# Patient Record
Sex: MALE | Race: WHITE | Employment: FULL TIME
[De-identification: names, ages, dates, MRNs, and addresses within clinical notes are randomized per-mention and may not be internally consistent; named-entity substitution may affect disease eponyms.]

---

## 2021-11-18 ENCOUNTER — TRANSCRIBE ORDERS (OUTPATIENT)
Dept: OTHER | Age: 41
End: 2021-11-18

## 2021-11-18 DIAGNOSIS — M79.7 FIBROMYALGIA: Primary | ICD-10-CM

## 2022-01-19 ENCOUNTER — OFFICE VISIT (OUTPATIENT)
Dept: ANESTHESIOLOGY | Facility: CLINIC | Age: 42
End: 2022-01-19
Payer: COMMERCIAL

## 2022-01-19 VITALS — OXYGEN SATURATION: 100 % | HEART RATE: 98 BPM | SYSTOLIC BLOOD PRESSURE: 136 MMHG | DIASTOLIC BLOOD PRESSURE: 83 MMHG

## 2022-01-19 DIAGNOSIS — M79.7 FIBROMYALGIA: Primary | ICD-10-CM

## 2022-01-19 PROCEDURE — 99204 OFFICE O/P NEW MOD 45 MIN: CPT | Performed by: ANESTHESIOLOGY

## 2022-01-19 ASSESSMENT — PAIN SCALES - GENERAL: PAINLEVEL: SEVERE PAIN (6)

## 2022-01-19 NOTE — NURSING NOTE
Patient presents with:  Consult: UMP NEW,RM 14, patient reports 6/10 pain in their knees and feet      Severe Pain (6)         What medications are you using for pain? N/A    (New patients only) Have you been seen by another pain clinic/ provider? N/A      Yonny Maza, EMT

## 2022-01-19 NOTE — PROGRESS NOTES
"                      [facility] Consultation    Date of visit: 1/19/2022    Reason for consultation:    Rosa Mcknight is a 41 year old male who is seen in consultation today [facility].    Primary Care Provider is Clinic, [facility].  Pain medications are being prescribed by PCP.    Please see the [facility] health questionnaire which the patient completed and reviewed with me in detail.    Chief Complaint:    Chief Complaint   Patient presents with     Consult     UMP NEW,RM 14, patient reports 6/10 pain in their knees and feet       Pain history:  Rosa Mcknight is a 41 year old male who first started having problems with pain throughout her body for several years. She fell while in the  and attributes this to the beginning of her pain. 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 [facility] but is [facility] for another opinion.     She notes chronic fatigue, morning \"sick to the stomach\" and morning daily migraines. [facility] and she wished for \"something more\" for possible other therapies such as massage therapy. Dr Machado prescribed magnesium and B2 vitamin which helped for the day but the same symptoms occur in the morning. She has been diagnosed with GERD but has not had an EGD. Her son does have a history of ulcerative colitis and IBS is something in the family.     Dr Machado did diagnose her with fibromyalgia as this was not likely rheumatoid arthritis.   The pain is always worse in the morning and improves throughout the day  The worst of the pain is in her head and in the neck. However, her symptoms are migratory in nature and she identifies stress as a significant trigger for worsening of her symptoms.       Pain rating: " intensity ranges from Averages 8/10 on a 0-10 scale.  Aggravating factors include: stress, gluten  Relieving factors include: heat packs, helps at bedtime  Any bowel or bladder incontinence: no    Current treatments include:  Magnesium   B2  Midol    Other treatments have included:  Rosa Mcknight has been seen at a pain clinic in the past.  [facility]  PT: yes  Acupuncture: yes  Chiropractic: yes  TENs Unit: no  Injections: no    Past Medical History:  Past Medical History:   Diagnosis Date     Anxiety      Depressive disorder      There are no problems to display for this patient.      Past Surgical History:  No past surgical history on file.  Medications:  Current Outpatient Medications   Medication Sig Dispense Refill     BuPROPion HCl (WELLBUTRIN PO)  (Patient not taking: Reported on 1/19/2022)       HYDROXYZINE HCL PO  (Patient not taking: Reported on 1/19/2022)       LORazepam (ATIVAN) 1 MG tablet Take 1 tablet (1 mg) by mouth once as needed for anxiety (Patient not taking: Reported on 1/19/2022) 1 tablet 0     Allergies:     Allergies   Allergen Reactions     Kiwi Anaphylaxis     Banana      Other reaction(s): GI Upset     Coconut Fatty Acids Itching     Latex Hives     Mangifera Indica Itching     Percocet [Oxycodone-Acetaminophen] Anxiety and Itching     Pineapple Hives     Other reaction(s): Numbness     Social History:  Home situation: lives with 3 teenage children 19,18, 14 (all kids have special needs)  Occupation/Schooling: 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  Family history of headaches: sister has history of migraines    Review of Systems:    POSTIVE IN BOLD  GENERAL: fever/chills, fatigue, general unwell feeling, weight gain/loss.  HEAD/EYES:  headache, dizziness, or vision changes.     EARS/NOSE/THROAT:  Nosebleeds, hearing loss, sinus infection, earache, tinnitus.  IMMUNE:  Allergies, cancer, immune deficiency, or infections.  SKIN:  Urticaria, rash, hives  HEME/Lymphatic:   anemia, easy bruising, easy bleeding.  RESPIRATORY:  cough, wheezing, or shortness of breath  CARDIOVASCULAR/Circulation:  Extremity edema, syncope, hypertension, tachycardia, or angina.  GASTROINTESTINAL:  abdominal pain, nausea/emesis, diarrhea, constipation,  hematochezia, or melena.  ENDOCRINE:  Diabetes, steroid use,  thyroid disease or osteoporosis.  MUSCULOSKELETAL: neck pain, back pain, arthralgia, arthritis, or gout.  GENITOURINARY:  frequency, urgency, dysuria, difficulty voiding, hematuria or incontinence.  NEUROLOGIC:  weakness, numbness, paresthesias, seizure, tremor, stroke or memory loss.  PSYCHIATRIC:  depression, anxiety, stress, suicidal thoughts or mood swings.     Physical Exam:  Vitals:    01/19/22 1338   BP: 136/83   Pulse: 98   SpO2: 100%     Exam:  Constitutional: healthy, alert and no distress  Head: normocephalic. Atraumatic.   Eyes: no redness or jaundice noted   ENT: oropharnx normal.  MMM.  Neck supple.    Cardiovascular: RRR no m/g/r   Respiratory: clear   Gastrointestinal: soft, non-tender, normoactive bowel sounds   : deferred  Skin: no suspicious lesions or rashes  Psychiatric: mentation appears normal and affect normal/bright    Musculoskeletal exam:  Gait/Station/Posture: normal, non-antalgic  Cervical spine: No TTP, Full ROM  Lumbar spine: Full ROM, no ttp     Neurologic exam:  CN:  Cranial nerves 2-12 are normal  Motor:  5/5 UE and LE strength    Diagnostic tests: unable to view via Epic or [employer]    [place] Prescription Monitoring Program reviewed    Outside records reviewed      Assessment:  1. Fibromyalgia    Rosa Mcknight is a 41 year old male who presents with the complaints of chronic pain and fatigue that has been previously diagnosed as fibromyalgia. [facility] and we discussed treatment options. However, she has [facility] options available [facility] the [facility] and has done many of the therapies recommended. We did discuss physical therapy as one of the most beneficial therapies for fibromyalgia and a referral for pool therapy was placed. 3    Plan:  Diagnosis reviewed, treatment option addressed, and risk/benefits discussed.  Self-care instructions given.  I am recommending a multidisciplinary treatment plan to help this patient better manage his pain.      1. Physical Therapy: Referral for pool therapy placed  2. Pain Psychologist to address issues of relaxation, behavioral change, coping style, and other factors important to improvement: [facility]  3. Diagnostic Studies: none  4. Medication Management: no changes  5. Further procedures recommended: none  6. Follow up: as needed    Total time spent was 45 minutes, and more than 50% of face to face time was spent in counseling and/or coordination of care regarding principles of multidisciplinary care, medication management, and therapeutic options. This time also includes time for chart review, preparation, and documentation.     Camilla Quezada MD    Pain Medicine  Department of Anesthesiology  [facility]

## 2022-01-19 NOTE — LETTER
"1/19/2022       RE: Rosa Mcknight  [address] [place] [id number]     Dear Colleague,    Thank you for referring your patient, Rosa Mcknight, to the [facility] [place] at [facility]. Please see a copy of my visit note below.                          [facility] Consultation    Date of visit: 1/19/2022    Reason for consultation:    Rosa Mcknight is a 41 year old male who is seen in consultation today at the [facility]    Primary Care Provider is Clinic, [facility].  Pain medications are being prescribed by PCP.    Please see the [facility] health questionnaire which the patient completed and reviewed with me in detail.    Chief Complaint:    Chief Complaint   Patient presents with     Consult     MICHELLE HIDALGO,RM 14, patient reports 6/10 pain in their knees and feet       Pain history:  Rosa Mcknight is a 41 year old male who first started having problems with pain throughout her body for several years. She fell while in the  and attributes this to the beginning of her pain. 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 [facility] but is here for another opinion.     She notes chronic fatigue, morning \"sick to the stomach\" and morning daily migraines. [facility] and she wished for \"something more\" for possible other therapies such as massage therapy. Dr Machado prescribed magnesium and B2 vitamin which helped for the day but the same symptoms occur in the morning. She has been diagnosed with GERD but has not had an EGD. Her son does have a history of ulcerative colitis and IBS is something in the family.     Dr" Carter did diagnose her with fibromyalgia as this was not likely rheumatoid arthritis.   The pain is always worse in the morning and improves throughout the day  The worst of the pain is in her head and in the neck. However, her symptoms are migratory in nature and she identifies stress as a significant trigger for worsening of her symptoms.       Pain rating: intensity ranges from Averages 8/10 on a 0-10 scale.  Aggravating factors include: stress, gluten  Relieving factors include: heat packs, helps at bedtime  Any bowel or bladder incontinence: no    Current treatments include:  Magnesium   B2  Midol    Other treatments have included:  Rosa Mcknight has been seen at a pain clinic in the past.  [facility]  PT: yes  Acupuncture: yes  Chiropractic: yes  TENs Unit: no  Injections: no    Past Medical History:  Past Medical History:   Diagnosis Date     Anxiety      Depressive disorder      There are no problems to display for this patient.      Past Surgical History:  No past surgical history on file.  Medications:  Current Outpatient Medications   Medication Sig Dispense Refill     BuPROPion HCl (WELLBUTRIN PO)  (Patient not taking: Reported on 1/19/2022)       HYDROXYZINE HCL PO  (Patient not taking: Reported on 1/19/2022)       LORazepam (ATIVAN) 1 MG tablet Take 1 tablet (1 mg) by mouth once as needed for anxiety (Patient not taking: Reported on 1/19/2022) 1 tablet 0     Allergies:     Allergies   Allergen Reactions     Kiwi Anaphylaxis     Banana      Other reaction(s): GI Upset     Coconut Fatty Acids Itching     Latex Hives     Mangifera Indica Itching     Percocet [Oxycodone-Acetaminophen] Anxiety and Itching     Pineapple Hives     Other reaction(s): Numbness     Social History:  Home situation: lives with 3 teenage children 19,18, 14 (all kids have special needs)  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  Family history of headaches: sister has history of migraines    Review of Systems:    POSTIVE IN BOLD  GENERAL: fever/chills, fatigue, general unwell feeling, weight gain/loss.  HEAD/EYES:  headache, dizziness, or vision changes.    EARS/NOSE/THROAT:  Nosebleeds, hearing loss, sinus infection, earache, tinnitus.  IMMUNE:  Allergies, cancer, immune deficiency, or infections.  SKIN:  Urticaria, rash, hives  HEME/Lymphatic:   anemia, easy bruising, easy bleeding.  RESPIRATORY:  cough, wheezing, or shortness of breath  CARDIOVASCULAR/Circulation:  Extremity edema, syncope, hypertension, tachycardia, or angina.  GASTROINTESTINAL:  abdominal pain, nausea/emesis, diarrhea, constipation,  hematochezia, or melena.  ENDOCRINE:  Diabetes, steroid use,  thyroid disease or osteoporosis.  MUSCULOSKELETAL: neck pain, back pain, arthralgia, arthritis, or gout.  GENITOURINARY:  frequency, urgency, dysuria, difficulty voiding, hematuria or incontinence.  NEUROLOGIC:  weakness, numbness, paresthesias, seizure, tremor, stroke or memory loss.  PSYCHIATRIC:  depression, anxiety, stress, suicidal thoughts or mood swings.     Physical Exam:  Vitals:    01/19/22 1338   BP: 136/83   Pulse: 98   SpO2: 100%     Exam:  Constitutional: healthy, alert and no distress  Head: normocephalic. Atraumatic.   Eyes: no redness or jaundice noted   ENT: oropharnx normal.  MMM.  Neck supple.    Cardiovascular: RRR no m/g/r   Respiratory: clear   Gastrointestinal: soft, non-tender, normoactive bowel sounds   : deferred  Skin: no suspicious lesions or rashes  Psychiatric: mentation appears normal and affect normal/bright    Musculoskeletal exam:  Gait/Station/Posture: normal, non-antalgic  Cervical spine: No TTP, Full ROM  Lumbar spine: Full ROM, no ttp     Neurologic exam:  CN:  Cranial nerves 2-12 are  normal  Motor:  5/5 UE and LE strength    Diagnostic tests: unable to view via Epic or [employer] Prescription Monitoring Program reviewed    Outside records reviewed      Assessment:  1. Fibromyalgia    Rosa Mcknight is a 41 year old male who presents with the complaints of chronic pain and fatigue that has been previously diagnosed as fibromyalgia. [facility] and we discussed treatment options. However, she has [facility] available [facility] and has done many of the therapies recommended. We did discuss physical therapy as one of the most beneficial therapies for fibromyalgia and a referral for pool therapy was placed. 3    Plan:  Diagnosis reviewed, treatment option addressed, and risk/benefits discussed.  Self-care instructions given.  I am recommending a multidisciplinary treatment plan to help this patient better manage his pain.      1. Physical Therapy: Referral for pool therapy placed  2. Pain Psychologist to address issues of relaxation, behavioral change, coping style, and other factors important to improvement: [facility]  3. Diagnostic Studies: none  4. Medication Management: no changes  5. Further procedures recommended: none  6. Follow up: as needed    Total time spent was 45 minutes, and more than 50% of face to face time was spent in counseling and/or coordination of care regarding principles of multidisciplinary care, medication management, and therapeutic options. This time also includes time for chart review, preparation, and documentation.       Camilla Quezada MD    Pain Medicine  Department of Anesthesiology  [facility]

## 2022-01-19 NOTE — PATIENT INSTRUCTIONS
Referrals:      Pool Therapy referral placed.      External [facility] Clinics:    [facility]: [place] [phone]    [facility]: Multiple locations around [facility]: [place], [phone]        Recommended Follow up:      Follow up as needed.         Please call [phone], option #1 to schedule your clinic appointment if you don't already have an appointment scheduled.        To speak with a nurse, schedule/reschedule/cancel a clinic appointment, or request a medication refill call: [phone], option #1.    You can also reach us by [email or web address]: [email or web address]